# Patient Record
Sex: MALE | Race: WHITE | NOT HISPANIC OR LATINO | Employment: OTHER | URBAN - METROPOLITAN AREA
[De-identification: names, ages, dates, MRNs, and addresses within clinical notes are randomized per-mention and may not be internally consistent; named-entity substitution may affect disease eponyms.]

---

## 2019-11-12 ENCOUNTER — TELEPHONE (OUTPATIENT)
Dept: UROLOGY | Facility: CLINIC | Age: 74
End: 2019-11-12

## 2019-11-12 NOTE — TELEPHONE ENCOUNTER
----- Message from Cindy Moseley sent at 11/12/2019  9:34 AM CST -----  Contact: pt   [employer] calling regarding supplies for patient please give them a call [phone] ref# [id number].        Thanks   Cindy Moseley

## 2020-02-26 ENCOUNTER — OFFICE VISIT (OUTPATIENT)
Dept: UROLOGY | Facility: CLINIC | Age: 75
End: 2020-02-26
Payer: MEDICARE

## 2020-02-26 VITALS
SYSTOLIC BLOOD PRESSURE: 165 MMHG | HEIGHT: 69 IN | RESPIRATION RATE: 17 BRPM | BODY MASS INDEX: 26.66 KG/M2 | HEART RATE: 62 BPM | DIASTOLIC BLOOD PRESSURE: 87 MMHG | WEIGHT: 180 LBS

## 2020-02-26 DIAGNOSIS — C67.9 MALIGNANT NEOPLASM OF URINARY BLADDER, UNSPECIFIED SITE: Primary | ICD-10-CM

## 2020-02-26 DIAGNOSIS — N28.89 RIGHT RENAL MASS: ICD-10-CM

## 2020-02-26 DIAGNOSIS — Z90.79 S/P RADICAL CYSTOPROSTATECTOMY: ICD-10-CM

## 2020-02-26 DIAGNOSIS — Z90.6 S/P RADICAL CYSTOPROSTATECTOMY: ICD-10-CM

## 2020-02-26 PROCEDURE — 99213 OFFICE O/P EST LOW 20 MIN: CPT | Mod: S$GLB,,, | Performed by: NURSE PRACTITIONER

## 2020-02-26 PROCEDURE — 99213 PR OFFICE/OUTPT VISIT, EST, LEVL III, 20-29 MIN: ICD-10-PCS | Mod: S$GLB,,, | Performed by: NURSE PRACTITIONER

## 2020-02-26 RX ORDER — PEN NEEDLE, DIABETIC 30 GX3/16"
NEEDLE, DISPOSABLE MISCELLANEOUS
COMMUNITY
Start: 2020-02-21

## 2020-02-26 RX ORDER — METOPROLOL TARTRATE 25 MG/1
TABLET, FILM COATED ORAL
COMMUNITY

## 2020-02-26 RX ORDER — AMLODIPINE AND BENAZEPRIL HYDROCHLORIDE 10; 20 MG/1; MG/1
CAPSULE ORAL
COMMUNITY

## 2020-02-26 RX ORDER — ATORVASTATIN CALCIUM 80 MG/1
TABLET, FILM COATED ORAL
COMMUNITY
Start: 2020-01-13

## 2020-02-26 NOTE — PROGRESS NOTES
Chief Complaint:  Six-month follow-up bladder cancer    HPI:  74-year-old  male known to Dr. Mathews who presents for six-month follow-up.  Patient has a history of muscle invasive bladder cancer.  He is now status post robotic assisted laparoscopic radical cysto prostatectomy with ileal conduit urinary diversion on May 3, 2019.  Final pathology showed no evidence of residual disease.  Lymph nodes were negative.  He has healed very well.  No complaints today.  He still sees Dr. Juares who did administer neoadjuvant chemotherapy.  We are planning to do repeat imaging if Dr. Juares has not done any imaging, patient is not sure if he is had anything done recently.    He has a small right lower pole renal mass 1.6 cm x 0.6 cm which we are monitoring.    He explains that he recently underwent colonoscopy on 02/11/2020 with Dr. justyn hdz.  He brought a copy of this report, 3 polyps removed from colon.     Allergies:  Patient has no known allergies.    Medications:  has a current medication list which includes the following prescription(s): lactobac no.41/bifidobact no.7, amlodipine-benazepril 10-20mg, atorvastatin, metoprolol tartrate, and pen needle, diabetic.    Review of Systems:  General: No fever, chills, vision changes, dizziness, weakness, fatigue, unexplained weight loss, confusion, or mood swings.  Skin: No rashes, itching, or changes in color/texture of skin.  Chest: Denies BAILEY, cyanosis, wheezing, cough, and sputum production.  Abdomen: Appetite fine. Denies diarrhea, abdominal pain, hematemesis, or blood in stool.  Musculoskeletal: No joint stiffness or swelling. Denies back pain.  : As above.  All other review of systems negative.    PMH:   has a past medical history of Diabetes mellitus, History of heart attack, Hyperlipidemia, and Hypertension.    PSH:   has a past surgical history that includes urostomy; Back surgery; Liver surgery; Coronary angioplasty with stent; and Bladder surgery.    FamHx:  family history includes Cancer in his brother.    SocHx:  reports that he has never smoked. He has never used smokeless tobacco. He reports that he does not drink alcohol or use drugs.      Physical Exam:  Vitals:    02/26/20 1022   BP: (!) 165/87   Pulse: 62   Resp: 17     General: AAOx3, no apparent distress, no deformities  Neck: supple, no masses, normal thyroid, full ROM  Lungs: CTAB, normal inspiration, no use of accessory muscles  Heart: regular rate and rhythm, no arrhythmias  Abdomen: soft, NT, ND, no masses, no hernias, no hepatosplenomegaly  Lymphatic: no unusually enlarged or tender lymph nodes  Skin: warm and dry, no jaundice.  Ext: without edema or deformity.  : ileal conduit urinary diversion site noted with drainage of clear yellow urine    Labs/Studies: none    Impression/Plan:   Malignant neoplasm of urinary bladder, unspecified site  Comments:  muscle invasive, s/p cystoprostatectomy, final path showed no residual disease, sees Dr. Juares for oncology, due for imaging, will request records    S/P radical cystoprostatectomy  Comments:  5/3/2019, with ileal conduit urinary diversion, no current complaints    Right renal mass  Comments:  Stable, monitoring, right lower pole renal mass 1.6 cm x 0.6 cm    HTN: discussed and referred to PCP for further management.    Follow up in about 6 months (around 8/26/2020) for imaging results/6m follow up bladder cancer.

## 2020-02-26 NOTE — Clinical Note
74-year-old with muscle invasive bladder cancer status post robotic assisted laparoscopic radical cysto prostatectomy with ileal conduit urinary diversion in May 2019.  Final pathology showed no evidence of residual disease.  Lymph nodes were negative.  Sees Dr. Juares who did administer velma adjuvant chemotherapy.  He planned for repeat imaging at this time if no other recent studies were performed, I did request a copy of any recent imaging from Oncology but if no recent ones done then what imaging study should I order?

## 2020-02-27 ENCOUNTER — TELEPHONE (OUTPATIENT)
Dept: UROLOGY | Facility: CLINIC | Age: 75
End: 2020-02-27

## 2020-02-27 NOTE — TELEPHONE ENCOUNTER
Please call this patient and set him up for blood draw for BMP and CBC, let him know that once we get lab results back then we are going to be ordering a CT scan and CXR for imaging studies

## 2020-02-27 NOTE — PROGRESS NOTES
CT scan of the abdomen and pelvis with IV contrast if his renal function is adequate, chest x-ray, CBC, CMP.

## 2020-02-27 NOTE — TELEPHONE ENCOUNTER
----- Message from Corbin Mathews MD sent at 2/26/2020  7:48 PM CST -----      ----- Message -----  From: Jamee Zayas NP  Sent: 2/26/2020   3:00 PM CST  To: Corbin Mathews MD    74-year-old with muscle invasive bladder cancer status post robotic assisted laparoscopic radical cysto prostatectomy with ileal conduit urinary diversion in May 2019.  Final pathology showed no evidence of residual disease.  Lymph nodes were negative.  Sees Dr. Juares who did administer velma adjuvant chemotherapy.  He planned for repeat imaging at this time if no other recent studies were performed, I did request a copy of any recent imaging from [facility] but if no recent ones done then what imaging study should I order?

## 2020-02-28 ENCOUNTER — TELEPHONE (OUTPATIENT)
Dept: UROLOGY | Facility: CLINIC | Age: 75
End: 2020-02-28

## 2020-02-28 NOTE — TELEPHONE ENCOUNTER
This patient needs to be set up for blood draw prior to repeat imaging, please set up for a nurse visit

## 2020-02-28 NOTE — TELEPHONE ENCOUNTER
----- Message from Rosita Rainey sent at 2/27/2020  3:58 PM CST -----  Contact: patient  Type:  Patient Returning Call    Who Called:patient  Who Left Message for Patient:Jamee  Does the patient know what this is regarding?:yes  Would the patient rather a call back or a response via [facility]? Call back  Best Call Back Number:[phone]  Additional Information: n/a

## 2020-03-02 NOTE — TELEPHONE ENCOUNTER
Can we try to call this patient back please to set him up for blood draw? I will also reach out to his PCP to see if he has had any recent labs just in case

## 2020-03-10 NOTE — TELEPHONE ENCOUNTER
Can yall please call him back to see if he had his labs drawn so that we can get a copy of them so I can order further testing?

## 2020-03-10 NOTE — TELEPHONE ENCOUNTER
Pt states that he has an appt with Dr. Fofana on next week but states that since he has blood that he has passed twice he is going to try to get in with him tomorrow. But wanted to inform the office regarding passing blood in his catheter. NB

## 2020-03-11 ENCOUNTER — TELEPHONE (OUTPATIENT)
Dept: UROLOGY | Facility: CLINIC | Age: 75
End: 2020-03-11

## 2020-03-11 NOTE — TELEPHONE ENCOUNTER
Called patient in regards to issues with cath. Patient noted appointment with PCP on Friday 3/13/2020 and PCP will address any issues patient is experiencing.

## 2020-03-17 NOTE — TELEPHONE ENCOUNTER
Attempted to contact pt about blood draw as to which place he went so that we can obtain a copy of his labs. KEATON for return call back. NB

## 2020-03-17 NOTE — TELEPHONE ENCOUNTER
Can we see if we can get the lab results on this patient that were supposed to be drawn on Friday? I think they were drawn at [facility]

## 2020-03-18 ENCOUNTER — TELEPHONE (OUTPATIENT)
Dept: UROLOGY | Facility: CLINIC | Age: 75
End: 2020-03-18

## 2020-03-18 NOTE — TELEPHONE ENCOUNTER
----- Message from Alley Kearney sent at 3/17/2020 12:45 PM CDT -----  Contact: pt  Type:  Patient Returning Call    Who Called:Colin Mcdaniels  Who Left Message for Patient: Nurse Capellan  Does the patient know what this is regarding?:return a call  Would the patient rather a call back or a response via [facility]? call  Best Call Back Number:[phone]   Additional Information:

## 2020-03-18 NOTE — TELEPHONE ENCOUNTER
We attempted to contact this patient multiple times since I last messaged you about him, I was able to obtain recent imaging and I have a call out to the lab to fax over recent lab results in case you want to do any repeat imaging

## 2020-03-18 NOTE — TELEPHONE ENCOUNTER
Spoke with patient, he did have recent imaging performed by Dr. Juares so I placed copies of these reports on Dr. Mathews's desk for review as he may want repeat imaging, I called [facility] urgent care where he had labs drawn and left a message to send copy of recent lab results

## 2020-03-19 ENCOUNTER — TELEPHONE (OUTPATIENT)
Dept: UROLOGY | Facility: CLINIC | Age: 75
End: 2020-03-19

## 2020-03-19 NOTE — TELEPHONE ENCOUNTER
Spoke with pt and advised pt that JUAN requested for him to schedule an appt ASAP to discuss his results. Pt verbalized understanding and appt scheduled 3/20/20 @ 10:40 am. NB

## 2020-03-19 NOTE — TELEPHONE ENCOUNTER
Keep trying a need him to come for an office visit side considered and discussed with him.  The hydronephrosis on the right side as pretty significan and eventually has gone affect the functioning of the right kidney

## 2020-03-20 ENCOUNTER — OFFICE VISIT (OUTPATIENT)
Dept: UROLOGY | Facility: CLINIC | Age: 75
End: 2020-03-20
Payer: MEDICARE

## 2020-03-20 VITALS
SYSTOLIC BLOOD PRESSURE: 149 MMHG | HEART RATE: 63 BPM | BODY MASS INDEX: 26.66 KG/M2 | WEIGHT: 180 LBS | RESPIRATION RATE: 19 BRPM | TEMPERATURE: 98 F | HEIGHT: 69 IN | DIASTOLIC BLOOD PRESSURE: 82 MMHG

## 2020-03-20 DIAGNOSIS — N13.30 HYDROURETERONEPHROSIS: Primary | ICD-10-CM

## 2020-03-20 PROCEDURE — 99213 OFFICE O/P EST LOW 20 MIN: CPT | Mod: S$GLB,,, | Performed by: SPECIALIST

## 2020-03-20 PROCEDURE — 99213 PR OFFICE/OUTPT VISIT, EST, LEVL III, 20-29 MIN: ICD-10-PCS | Mod: S$GLB,,, | Performed by: SPECIALIST

## 2020-03-20 RX ORDER — CIPROFLOXACIN 500 MG/1
TABLET ORAL
COMMUNITY
Start: 2020-03-16

## 2020-03-20 NOTE — PROGRESS NOTES
"Subjective:       Patient ID: Colin Mcdaniels is a 74 y.o. male.    Chief Complaint: Follow-up (F/U per JUAN)      HPI:  74-year-old man has muscle invasive bladder cancer status post robotic radical cysto prostatectomy with ileal conduit urinary diversion on May 3, 2019.  Final pathology showed no evidence of residual disease.  Lymph nodes were all negative.    He had received neoadjuvant chemotherapy with Dr. Francisco Juares.  She continues to follow-up with this medical oncologist for routine check.  A CT abdomen and pelvis with IV contrast was ordered by his oncologist for metastatic evaluation All to determine if there is recurring disease.  CT scan showed hydroureteronephrosis on the right side down to what seems to be the enteral ureteral anastomotic site.  The CT scan was forwarded us I did review the film itself and was concerned.  She is here today for follow-up discussion to move forward.    Past Medical History:   Past Medical History:   Diagnosis Date    Diabetes mellitus     History of heart attack     x4    Hyperlipidemia     Hypertension        Past Surgical Historical:   Past Surgical History:   Procedure Laterality Date    BACK SURGERY      BLADDER SURGERY      CORONARY ANGIOPLASTY WITH STENT PLACEMENT      x3    LIVER SURGERY      urostomy          Medications:   Medication List with Changes/Refills   Current Medications    AMLODIPINE-BENAZEPRIL 10-20MG (LOTREL) 10-20 MG PER CAPSULE    amlodipine 10 mg-benazepril 20 mg capsule    ATORVASTATIN (LIPITOR) 80 MG TABLET        CIPROFLOXACIN HCL (CIPRO) 500 MG TABLET        LACTOBAC NO.41/BIFIDOBACT NO.7 (PROBIOTIC-10 ORAL)    Take by mouth.    METOPROLOL TARTRATE (LOPRESSOR) 25 MG TABLET    metoprolol tartrate 25 mg tablet    PEN NEEDLE, DIABETIC 32 GAUGE X 5/32" NDLE            Past Social History:   Social History     Socioeconomic History    Marital status:      Spouse name: Not on file    Number of children: Not on file    Years " of education: Not on file    Highest education level: Not on file   Occupational History    Not on file   Social Needs    Financial resource strain: Not on file    Food insecurity:     Worry: Not on file     Inability: Not on file    Transportation needs:     Medical: Not on file     Non-medical: Not on file   Tobacco Use    Smoking status: Never Smoker    Smokeless tobacco: Never Used   Substance and Sexual Activity    Alcohol use: Never     Frequency: Never    Drug use: Never    Sexual activity: Not on file   Lifestyle    Physical activity:     Days per week: Not on file     Minutes per session: Not on file    Stress: Not on file   Relationships    Social connections:     Talks on phone: Not on file     Gets together: Not on file     Attends [employer] service: Not on file     Active member of club or organization: Not on file     Attends meetings of clubs or organizations: Not on file     Relationship status: Not on file   Other Topics Concern    Not on file   Social History Narrative    Not on file       Allergies: Review of patient's allergies indicates:  No Known Allergies     Family History:   Family History   Problem Relation Age of Onset    Cancer Brother         Review of Systems:   systems reviewed and notable for new onset hydroureteronephrosis on the right side  All other systems were reviewed Neg except as stated in the HPI    Physical Exam:  General: A&Ox3. No apparent distress. No deformities.  Neck: No masses. Normal thyroid.  Lungs: normal inspiration. No use of accessory muscles.  Heart: normal pulse. No arrhythmias.  Abdomen: Soft. NT. ND. No masses. No hernias. No hepatosplenomegaly.  Lymphatic: Neck and groin nodes negative.  Skin: The skin is warm and dry. No jaundice.  Neurology: Cranial nerves 2-12 crossly intact, no focal weaknesses, no sensation deficits, no motor deficits  Ext: No clubbing, cyanosis or edema.  :  Deferred    Assessment/Plan:       74-year-old man with  muscle invasive bladder cancer status post robotic assisted laparoscopic radical cysto prostatectomy May 2019 was not developed right-sided hydroureteronephrosis.    1.  He will need loop endoscopy with attempted placement of a stent or balloon dilation of the stricture site to see if that can address the problem.  If we are unsuccessful then which has sent him to the interventional radiologist for antegrade stent placement.  Thing is worth trying the loop endoscopy 1st because his less morbid.  2.  We should postpone this procedure until about 30 days out given the states recommendations about doing all a emergency or urgent cases.  3.  Patient will continue his routine follow-up with us every 6 months.    Problem List Items Addressed This Visit        Renal/    Hydroureteronephrosis - Primary

## 2020-03-31 ENCOUNTER — TELEPHONE (OUTPATIENT)
Dept: UROLOGY | Facility: CLINIC | Age: 75
End: 2020-03-31

## 2020-03-31 NOTE — TELEPHONE ENCOUNTER
Spouse states that pt needs urostomy supplies but states that he is running low. Nurse inquired about the company where the supplies are ordered from so that we can contact them for an order. Spouse verbalized understanding and requested that [employer] be used. NB       ----- Message from Cindy Moseley sent at 3/30/2020 10:24 AM CDT -----  Contact: wife  Pt wife calling to get an rx for supplies. Pt is running out of multiple supplies pt want to get a call to discuss what supplies she can pet a rx for. Please give wife a call [phone]         Thanks   Cindy Moseley

## 2020-04-02 ENCOUNTER — TELEPHONE (OUTPATIENT)
Dept: UROLOGY | Facility: CLINIC | Age: 75
End: 2020-04-02

## 2020-04-02 NOTE — TELEPHONE ENCOUNTER
Spoke with [facility] 4-1-20 and requested order form for urostomy supplies. They faxed us cath order. I called pt and his spouse to notify them I did not think they did urostomy supplies.  Spouse reports [employer] was who home health was using but there are several issues. I told her I would check into an urostomy order/company.

## 2020-04-06 ENCOUNTER — TELEPHONE (OUTPATIENT)
Dept: UROLOGY | Facility: CLINIC | Age: 75
End: 2020-04-06

## 2020-04-06 NOTE — TELEPHONE ENCOUNTER
Attempted to contact pt about symptoms he is having, no answer, LM for return call back. NB    Pt contacted clinic and advised that he is having left side back pain with swelling and tenderness to left groin area, Nurse advised pt that he would need to be seen pt states that he would prefer to see kristi MARTINEZt scheduled for 4/9/20. NB    ----- Message from Mario Minor sent at 4/6/2020  3:09 PM CDT -----  Contact: Pt   Type:  Needs Medical Advice    Who Called: pt   Symptoms (please be specific): back pain   How long has patient had these symptoms:  A while , getting worse  Pharmacy name and phone #:   [facility]  Would the patient rather a call back or a response via [facility]? Phone   Best Call Back Number: [phone]  Additional Information:

## 2020-04-09 ENCOUNTER — OFFICE VISIT (OUTPATIENT)
Dept: UROLOGY | Facility: CLINIC | Age: 75
End: 2020-04-09
Payer: MEDICARE

## 2020-04-09 VITALS
HEART RATE: 78 BPM | WEIGHT: 180 LBS | BODY MASS INDEX: 26.66 KG/M2 | SYSTOLIC BLOOD PRESSURE: 158 MMHG | DIASTOLIC BLOOD PRESSURE: 91 MMHG | RESPIRATION RATE: 18 BRPM | HEIGHT: 69 IN

## 2020-04-09 DIAGNOSIS — B02.9 HERPES ZOSTER WITHOUT COMPLICATION: Primary | ICD-10-CM

## 2020-04-09 PROCEDURE — 99213 PR OFFICE/OUTPT VISIT, EST, LEVL III, 20-29 MIN: ICD-10-PCS | Mod: S$GLB,,, | Performed by: SPECIALIST

## 2020-04-09 PROCEDURE — 99213 OFFICE O/P EST LOW 20 MIN: CPT | Mod: S$GLB,,, | Performed by: SPECIALIST

## 2020-04-09 NOTE — PROGRESS NOTES
Subjective:       Patient ID: Colin Mcdaniels is a 74 y.o. male.    Chief Complaint: Groin Pain (left) and Other (rash )      HPI:  74-year-old man has muscle invasive bladder cancer status post robotic radical cysto prostatectomy with ileal conduit urinary diversion on May 3, 2019.  Final pathology showed no evidence of residual disease.  Lymph nodes were all negative.     He had received neoadjuvant chemotherapy with Dr. Francisco Juares.  He continues to follow-up with this medical oncologist for routine check.  A CT abdomen and pelvis with IV contrast was ordered by his oncologist for metastatic evaluation and to determine if there is recurring disease.  CT scan showed hydroureteronephrosis on the right side down to what seems to be the enteral ureteral anastomotic site.  The CT scan was forwarded us I did review the film itself and was concerned.  She is here today for follow-up discussion to move forward.    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  Patient is pending a day to proceed to the operating room after the COVID-19 outbreak.    The past 48 hr patient has developed a rash a painful rash initially in his groin area on the left side but subsequently on the right upper torso in the dermatomal fashion.  This is very suggestive of a shingles outbreak.  He reports that the rash is painful.  Prior to this outbreak he has been having a lot of stressful events including persistent lower back pain on the left side.    Past Medical History:   Past Medical History:   Diagnosis Date    Diabetes mellitus     History of heart attack     x4    Hyperlipidemia     Hypertension        Past Surgical Historical:   Past Surgical History:   Procedure Laterality Date    BACK SURGERY      BLADDER SURGERY      CORONARY ANGIOPLASTY WITH STENT  "PLACEMENT      x3    LIVER SURGERY      urostomy          Medications:   Medication List with Changes/Refills   Current Medications    AMLODIPINE-BENAZEPRIL 10-20MG (LOTREL) 10-20 MG PER CAPSULE    amlodipine 10 mg-benazepril 20 mg capsule    ATORVASTATIN (LIPITOR) 80 MG TABLET        CIPROFLOXACIN HCL (CIPRO) 500 MG TABLET        LACTOBAC NO.41/BIFIDOBACT NO.7 (PROBIOTIC-10 ORAL)    Take by mouth.    METOPROLOL TARTRATE (LOPRESSOR) 25 MG TABLET    metoprolol tartrate 25 mg tablet    PEN NEEDLE, DIABETIC 32 GAUGE X 5/32" NDLE            Past Social History:   Social History     Socioeconomic History    Marital status:      Spouse name: Not on file    Number of children: Not on file    Years of education: Not on file    Highest education level: Not on file   Occupational History    Not on file   Social Needs    Financial resource strain: Not on file    Food insecurity:     Worry: Not on file     Inability: Not on file    Transportation needs:     Medical: Not on file     Non-medical: Not on file   Tobacco Use    Smoking status: Never Smoker    Smokeless tobacco: Never Used   Substance and Sexual Activity    Alcohol use: Never     Frequency: Never    Drug use: Never    Sexual activity: Not on file   Lifestyle    Physical activity:     Days per week: Not on file     Minutes per session: Not on file    Stress: Not on file   Relationships    Social connections:     Talks on phone: Not on file     Gets together: Not on file     Attends [employer] service: Not on file     Active member of club or organization: Not on file     Attends meetings of clubs or organizations: Not on file     Relationship status: Not on file   Other Topics Concern    Not on file   Social History Narrative    Not on file       Allergies: Review of patient's allergies indicates:  No Known Allergies     Family History:   Family History   Problem Relation Age of Onset    Cancer Brother         Review of Systems:   systems " reviewed and notable for rash in the left groin and right upper torso  All other systems were reviewed Neg except as stated in the HPI      Physical Exam:  General: A&Ox3. No apparent distress. No deformities.  Neck: No masses. Normal thyroid.  Lungs: normal inspiration. No use of accessory muscles.  Heart: normal pulse. No arrhythmias.  Abdomen: Soft. NT. ND. No masses. No hernias. No hepatosplenomegaly.  There is a characteristic rash on his right-sided upper torso from around the xiphoid area spreading and wrapping around towards the back.   Lymphatic: Neck and groin nodes negative.  Skin: The skin is warm and dry. No jaundice.  Neurology: Cranial nerves 2-12 crossly intact, no focal weaknesses, no sensation deficits, no motor deficits  Ext: No clubbing, cyanosis or edema.  : External genitalia normal. circumcised. Meatus normal size and location.  There is a diffuse erythematous rash on the left groin extending to the flank.  This rash has areas of vesicular outbreaks.     Assessment/Plan:       74-year-old man with a history of muscle invasive bladder cancer status post radical cysto prostatectomy with ileal conduit urinary diversion now presenting with what looks like a shingles outbreak.    1.  I have informed the patient to call his primary care physician because I am not up-to-date on the literature on the management of shingles outbreaks.  2.  We should be putting him on the schedule for cystoscopy balloon dilation of a right-sided entero ureteral anastomotic stricture.    Problem List Items Addressed This Visit     None      Visit Diagnoses     Herpes zoster without complication    -  Primary

## 2020-05-29 ENCOUNTER — OFFICE VISIT (OUTPATIENT)
Dept: UROLOGY | Facility: CLINIC | Age: 75
End: 2020-05-29
Payer: MEDICARE

## 2020-05-29 VITALS
HEART RATE: 94 BPM | BODY MASS INDEX: 26.96 KG/M2 | SYSTOLIC BLOOD PRESSURE: 185 MMHG | RESPIRATION RATE: 18 BRPM | WEIGHT: 182 LBS | OXYGEN SATURATION: 99 % | HEIGHT: 69 IN | DIASTOLIC BLOOD PRESSURE: 85 MMHG

## 2020-05-29 DIAGNOSIS — N13.1 HYDRONEPHROSIS WITH URETERAL STRICTURE, NOT ELSEWHERE CLASSIFIED: ICD-10-CM

## 2020-05-29 DIAGNOSIS — N99.89 URETERAL-ILEAL LOOP ANASTOMOTIC STRICTURE: Primary | ICD-10-CM

## 2020-05-29 LAB
ANION GAP SERPL CALC-SCNC: 16 MMOL/L (ref 3–11)
APPEARANCE, UA: ABNORMAL
BACTERIA SPEC CULT: ABNORMAL /HPF
BASOPHILS NFR SNV MANUAL: 0.3 % (ref 0–3)
BILIRUB UR QL STRIP: NEGATIVE MG/DL
BUN SERPL-MCNC: 26 MG/DL (ref 7–18)
BUN/CREAT SERPL: 15.29 RATIO (ref 7–18)
CALCIUM BLD-MCNC: NORMAL MG/DL
CALCIUM SERPL-MCNC: 9.5 MG/DL (ref 8.8–10.5)
CHLORIDE SERPL-SCNC: 100 MMOL/L (ref 100–108)
CO2 SERPL-SCNC: 23 MMOL/L (ref 21–32)
COLOR UR: ABNORMAL
COVID-19 AB, IGM: NORMAL
CREAT SERPL-MCNC: 1.7 MG/DL (ref 0.7–1.3)
EOSINOPHIL NFR SNV MANUAL: 0.3 % (ref 1–3)
ERYTHROCYTE [DISTWIDTH] IN BLOOD BY AUTOMATED COUNT: 15.9 % (ref 12.5–18)
GFR ESTIMATION: 39
GLUCOSE (UA): NORMAL MG/DL
GLUCOSE SERPL-MCNC: 192 MG/DL (ref 70–110)
HCT VFR BLD AUTO: 46.3 % (ref 42–52)
HGB BLD-MCNC: 14.8 G/DL (ref 14–18)
HGB UR QL STRIP: 150 /UL
KETONES UR QL STRIP: NEGATIVE MG/DL
LEUKOCYTE ESTERASE UR QL STRIP: 500 /UL
LYMPHOCYTES NFR SNV MANUAL: 12.5 % (ref 25–40)
MANUAL NRBC PER 100 CELLS: 0 %
MCH RBC QN AUTO: 30.2 PG (ref 27–31.2)
MCHC RBC AUTO-ENTMCNC: 32 G/DL (ref 31.8–35.4)
MCV RBC AUTO: 94.5 FL (ref 80–97)
MONOCYTES/100 LEUKOCYTES: 1.7 % (ref 1–15)
NEUTROPHILS NFR BLD: 8.03 10*3/UL (ref 1.8–7.7)
NEUTROPHILS NFR SNV MANUAL: 84.8 % (ref 37–80)
NITRITE UR QL STRIP: POSITIVE
PH UR STRIP: 8 PH (ref 5–9)
PLATELETS: 122 10*3/UL (ref 142–424)
POTASSIUM SERPL-SCNC: 5.4 MMOL/L (ref 3.6–5.2)
PROT UR QL STRIP: 100 MG/DL
RBC # BLD AUTO: 4.9 10*6/UL (ref 4.7–6.1)
RBC #/AREA URNS HPF: ABNORMAL /HPF (ref 0–2)
SERVICE COMMENT 03: ABNORMAL
SODIUM BLD-SCNC: 139 MMOL/L (ref 135–145)
SP GR UR STRIP: 1.01 (ref 1–1.03)
SPECIMEN COLLECTION METHOD, URINE: ABNORMAL
SQUAMOUS EPITHELIAL, UA: ABNORMAL /LPF
UROBILINOGEN UR STRIP-ACNC: NORMAL MG/DL
WBC # BLD: 9.5 10*3/UL (ref 4.6–10.2)
WBC #/AREA URNS HPF: ABNORMAL /HPF (ref 0–5)

## 2020-05-29 PROCEDURE — 99213 PR OFFICE/OUTPT VISIT, EST, LEVL III, 20-29 MIN: ICD-10-PCS | Mod: S$GLB,,, | Performed by: SPECIALIST

## 2020-05-29 PROCEDURE — 99213 OFFICE O/P EST LOW 20 MIN: CPT | Mod: S$GLB,,, | Performed by: SPECIALIST

## 2020-05-29 RX ORDER — TRIAMCINOLONE ACETONIDE 1 MG/G
CREAM TOPICAL
COMMUNITY
Start: 2020-05-06

## 2020-05-29 RX ORDER — VALACYCLOVIR HYDROCHLORIDE 1 G/1
TABLET, FILM COATED ORAL
COMMUNITY
Start: 2020-04-22

## 2020-05-29 RX ORDER — INSULIN DEGLUDEC 100 U/ML
INJECTION, SOLUTION SUBCUTANEOUS
COMMUNITY
Start: 2020-03-31

## 2020-05-29 NOTE — PROGRESS NOTES
"Subjective:       Patient ID: Colin Mcdaniels is a 74 y.o. male.    Chief Complaint: Flank Pain (hurts on both sides in back area and has had blood/clots in urostomy bag in on 5/27/20)      HPI:  74-year-old man with a history of bladder cancer status post radical cystoprostatectomy with ileal conduit urinary diversion May 3, 2019.  Final pathology showed no evidence of residual disease lymph nodes were all negative.    A CT scan was obtained not too long ago that showed right-sided hydronephrosis.  Renal function was checked and was a little compromise.  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  His pending for trip to the operating room for stricture dilation and stent placement.    His complaint of back pain bilaterally in the lower back worse on the right side.  He recently just saw blood in his urine as well he happen just once.    Past Medical History:   Past Medical History:   Diagnosis Date    Diabetes mellitus     History of heart attack     x4    Hyperlipidemia     Hypertension     Shingles (herpes zoster) polyneuropathy        Past Surgical Historical:   Past Surgical History:   Procedure Laterality Date    BACK SURGERY      BLADDER SURGERY      CORONARY ANGIOPLASTY WITH STENT PLACEMENT      x3    LIVER SURGERY      urostomy          Medications:   Medication List with Changes/Refills   Current Medications    AMLODIPINE-BENAZEPRIL 10-20MG (LOTREL) 10-20 MG PER CAPSULE    amlodipine 10 mg-benazepril 20 mg capsule    ATORVASTATIN (LIPITOR) 80 MG TABLET        CIPROFLOXACIN HCL (CIPRO) 500 MG TABLET        LACTOBAC NO.41/BIFIDOBACT NO.7 (PROBIOTIC-10 ORAL)    Take by mouth.    METOPROLOL TARTRATE (LOPRESSOR) 25 MG TABLET    metoprolol tartrate 25 mg tablet    PEN NEEDLE, DIABETIC 32 GAUGE X 5/32" NDLE        TRESIBA FLEXTOUCH U-100 100 UNIT/ML (3 ML) INPN    INJECT 10 UNITS SUBCUTANEOUSLY EACH NIGHT    TRIAMCINOLONE ACETONIDE 0.1% (KENALOG) 0.1 % CREAM    APPLY CREAM " EXTERNALLY TO AFFECTED AREA TWICE DAILY FOR UP TO FIVE DAYS    VALACYCLOVIR (VALTREX) 1000 MG TABLET            Past Social History:   Social History     Socioeconomic History    Marital status:      Spouse name: Not on file    Number of children: Not on file    Years of education: Not on file    Highest education level: Not on file   Occupational History    Not on file   Social Needs    Financial resource strain: Not on file    Food insecurity:     Worry: Not on file     Inability: Not on file    Transportation needs:     Medical: Not on file     Non-medical: Not on file   Tobacco Use    Smoking status: Never Smoker    Smokeless tobacco: Never Used   Substance and Sexual Activity    Alcohol use: Never     Frequency: Never    Drug use: Never    Sexual activity: Not on file   Lifestyle    Physical activity:     Days per week: Not on file     Minutes per session: Not on file    Stress: Not on file   Relationships    Social connections:     Talks on phone: Not on file     Gets together: Not on file     Attends Congregational service: Not on file     Active member of club or organization: Not on file     Attends meetings of clubs or organizations: Not on file     Relationship status: Not on file   Other Topics Concern    Not on file   Social History Narrative    Not on file       Allergies: Review of patient's allergies indicates:  No Known Allergies     Family History:   Family History   Problem Relation Age of Onset    Cancer Brother         Review of Systems:   systems reviewed and notable for gross hematuria and an ileal conduit, right-sided hydronephrosis.  All other systems were reviewed Neg except as stated in the HPI    Physical Exam:  General: A&Ox3. No apparent distress. No deformities.  Neck: No masses. Normal thyroid.  Lungs: normal inspiration. No use of accessory muscles.  Heart: normal pulse. No arrhythmias.  Abdomen: Soft. NT. ND. No masses. No hernias. No hepatosplenomegaly, stoma  looks pink healthy no blood in the urine today.  Lymphatic: Neck and groin nodes negative.  Skin: The skin is warm and dry. No jaundice.  Neurology: Cranial nerves 2-12 crossly intact, no focal weaknesses, no sensation deficits, no motor deficits  Ext: No clubbing, cyanosis or edema.  :  Deferred      Assessment/Plan:       74-year-old man with right-sided enteric ureteral anastomotic stricture and hydronephrosis.  He recently saw blood in his urine.    1.  Put him on the schedule her next Tuesday for loop endoscopy stricture dilation and stent placement.  We should be looking for any recurrent pathology at that time.  2.  Obtain informed consent today in a tricky patient on the procedure.    Problem List Items Addressed This Visit     None      Visit Diagnoses     Ureteral-ileal loop anastomotic stricture    -  Primary    Relevant Orders    Ambulatory Referral to External Surgery    Hydronephrosis with ureteral stricture, not elsewhere classified

## 2020-05-31 ENCOUNTER — OUTSIDE PLACE OF SERVICE (OUTPATIENT)
Dept: UROLOGY | Facility: CLINIC | Age: 75
End: 2020-05-31
Payer: MEDICARE

## 2020-05-31 PROCEDURE — 99223 1ST HOSP IP/OBS HIGH 75: CPT | Mod: ,,, | Performed by: SPECIALIST

## 2020-05-31 PROCEDURE — 99223 PR INITIAL HOSPITAL CARE,LEVL III: ICD-10-PCS | Mod: ,,, | Performed by: SPECIALIST

## 2020-07-09 ENCOUNTER — OFFICE VISIT (OUTPATIENT)
Dept: UROLOGY | Facility: CLINIC | Age: 75
End: 2020-07-09
Payer: MEDICARE

## 2020-07-09 VITALS
DIASTOLIC BLOOD PRESSURE: 73 MMHG | BODY MASS INDEX: 26.96 KG/M2 | HEIGHT: 69 IN | WEIGHT: 182 LBS | HEART RATE: 70 BPM | SYSTOLIC BLOOD PRESSURE: 131 MMHG | RESPIRATION RATE: 18 BRPM

## 2020-07-09 DIAGNOSIS — C67.9 MALIGNANT NEOPLASM OF URINARY BLADDER, UNSPECIFIED SITE: ICD-10-CM

## 2020-07-09 DIAGNOSIS — N99.89 URETERAL-ILEAL LOOP ANASTOMOTIC STRICTURE: Primary | ICD-10-CM

## 2020-07-09 DIAGNOSIS — N17.9 AKI (ACUTE KIDNEY INJURY): ICD-10-CM

## 2020-07-09 LAB
ANION GAP SERPL CALC-SCNC: 10 MMOL/L (ref 8–17)
BUN/CREAT SERPL: 19.5 (ref 6–20)
CALCIUM SERPL-MCNC: 9.5 MG/DL (ref 8.6–10.2)
CARBON DIOXIDE, CO2: 26 MMOL/L (ref 22–29)
CHLORIDE: 97 MMOL/L (ref 98–107)
CREAT SERPL-MCNC: 1.65 MG/DL (ref 0.7–1.2)
GFR ESTIMATION: 40.76
GLUCOSE: 183 MG/DL (ref 82–115)
POTASSIUM: 5.7 MMOL/L (ref 3.5–5.1)
SODIUM: 133 MMOL/L (ref 136–145)
UREA NITROGEN (BUN): 32.2 MG/DL (ref 8–23)

## 2020-07-09 PROCEDURE — 99213 PR OFFICE/OUTPT VISIT, EST, LEVL III, 20-29 MIN: ICD-10-PCS | Mod: S$GLB,,, | Performed by: SPECIALIST

## 2020-07-09 PROCEDURE — 99213 OFFICE O/P EST LOW 20 MIN: CPT | Mod: S$GLB,,, | Performed by: SPECIALIST

## 2020-07-09 RX ORDER — METOPROLOL TARTRATE 25 MG/1
25 TABLET, FILM COATED ORAL
COMMUNITY

## 2020-07-09 RX ORDER — INSULIN DEGLUDEC 100 U/ML
INJECTION, SOLUTION SUBCUTANEOUS
COMMUNITY

## 2020-07-09 RX ORDER — METOCLOPRAMIDE 10 MG/1
10 TABLET ORAL
COMMUNITY
Start: 2019-05-10

## 2020-07-09 RX ORDER — ASPIRIN 81 MG/1
81 TABLET ORAL
COMMUNITY

## 2020-07-09 RX ORDER — PROMETHAZINE HYDROCHLORIDE 25 MG/1
25 TABLET ORAL
COMMUNITY
Start: 2019-05-10

## 2020-07-09 RX ORDER — AMLODIPINE AND BENAZEPRIL HYDROCHLORIDE 10; 20 MG/1; MG/1
CAPSULE ORAL
COMMUNITY

## 2020-07-09 RX ORDER — ERTAPENEM 1 G/1
INJECTION, POWDER, LYOPHILIZED, FOR SOLUTION INTRAMUSCULAR; INTRAVENOUS
COMMUNITY
Start: 2020-06-10

## 2020-07-09 RX ORDER — DIPHENHYDRAMINE HYDROCHLORIDE 50 MG/ML
INJECTION INTRAMUSCULAR; INTRAVENOUS
COMMUNITY
Start: 2020-06-05

## 2020-07-09 RX ORDER — ATORVASTATIN CALCIUM 80 MG/1
80 TABLET, FILM COATED ORAL
COMMUNITY

## 2020-07-09 NOTE — PROGRESS NOTES
Subjective:       Patient ID: Colin Mcdaniels is a 74 y.o. male.    Chief Complaint: Follow-up (hospital f/u)      HPI:  74-year-old man who underwent a TURBT to was end of 2018 after he presented with gross hematuria and was diagnosed with a bladder tumor.  Tumor pathology showed muscle invasive disease.  He received neoadjuvant chemotherapy with Dr. Francisco Juares at [facility].  On May 3rd 2019 he went to the operating room for radical cysto prostatectomy with ileal conduit urinary diversion.  Final pathology showed no evidence of residual disease in lymph nodes were all negative.    In December 2019 he has treating oncologist obtain a CT scan for disease monitoring and noted that he had new onset right sided hydroureteronephrosis.  We diagnosed him with right-sided ileo ureteral anastomotic stricture.  We will put him on the schedule for stricture dilation as well as stent placement but then because of the COVID-19 pandemic everything has to be placed on hold.  We met in clinic not long ago in placed him on the schedule again for June 2, 2020, however prior to the procedure date he presented to the hospital with in says and nausea vomiting abdominal pain.  He was diagnosed with a urinary tract infection.  He also reported some fevers and chills as well as lower back pain.  During that time and made arrangements for him to have a nephrostomy tube with an antegrade right-sided ureteral stent.  This was performed by the interventional radiologist.  Prior to being discharged home a to cut the nephrostomy tube.  He now has a stent in place.  He has completed a 14 day course of intravenous antibiotics at home.    Today complaints of dizziness and staggering gait.  He thinks is related to the antibiotics.  But is been about a week since he finish the antibiotics and if this continues that will need to be investigated.  He has adequate urinary output.    When he was admitted to the hospital May 30  "of 2020, his EGFR at that time was about 30 mL/min.    Past Medical History:   Past Medical History:   Diagnosis Date    Diabetes mellitus     History of heart attack     x4    Hyperlipidemia     Hypertension     Kidney infection     Shingles (herpes zoster) polyneuropathy        Past Surgical Historical:   Past Surgical History:   Procedure Laterality Date    BACK SURGERY      BLADDER SURGERY      CORONARY ANGIOPLASTY WITH STENT PLACEMENT      x3    LIVER SURGERY      urostomy          Medications:   Medication List with Changes/Refills   Current Medications    AMLODIPINE-BENAZEPRIL 10-20MG (LOTREL) 10-20 MG PER CAPSULE    amlodipine 10 mg-benazepril 20 mg capsule    AMLODIPINE-BENAZEPRIL 10-20MG (LOTREL) 10-20 MG PER CAPSULE    Bedtime    ASPIRIN (ECOTRIN) 81 MG EC TABLET    81 mg.    ATORVASTATIN (LIPITOR) 80 MG TABLET        ATORVASTATIN (LIPITOR) 80 MG TABLET    80 mg.    CIPROFLOXACIN HCL (CIPRO) 500 MG TABLET        DIPHENHYDRAMINE (BENADRYL) 50 MG/ML INJECTION        ERTAPENEM (INVANZ) 1 GRAM INJECTION        INSULIN DEGLUDEC 100 UNIT/ML SOLN    Daily    LACTOBAC NO.41/BIFIDOBACT NO.7 (PROBIOTIC-10 ORAL)    Take by mouth.    METOCLOPRAMIDE HCL (REGLAN) 10 MG TABLET    10 mg.    METOPROLOL TARTRATE (LOPRESSOR) 25 MG TABLET    metoprolol tartrate 25 mg tablet    METOPROLOL TARTRATE (LOPRESSOR) 25 MG TABLET    25 mg.    PEN NEEDLE, DIABETIC 32 GAUGE X 5/32" NDLE        PROMETHAZINE (PHENERGAN) 25 MG TABLET    25 mg.    TRESIBA FLEXTOUCH U-100 100 UNIT/ML (3 ML) INPN    INJECT 10 UNITS SUBCUTANEOUSLY EACH NIGHT    TRIAMCINOLONE ACETONIDE 0.1% (KENALOG) 0.1 % CREAM    APPLY CREAM EXTERNALLY TO AFFECTED AREA TWICE DAILY FOR UP TO FIVE DAYS    VALACYCLOVIR (VALTREX) 1000 MG TABLET            Past Social History:   Social History     Socioeconomic History    Marital status:      Spouse name: Not on file    Number of children: Not on file    Years of education: Not on file    Highest education " level: Not on file   Occupational History    Not on file   Social Needs    Financial resource strain: Not on file    Food insecurity     Worry: Not on file     Inability: Not on file    Transportation needs     Medical: Not on file     Non-medical: Not on file   Tobacco Use    Smoking status: Never Smoker    Smokeless tobacco: Never Used   Substance and Sexual Activity    Alcohol use: Never     Frequency: Never    Drug use: Never    Sexual activity: Not on file   Lifestyle    Physical activity     Days per week: Not on file     Minutes per session: Not on file    Stress: Not on file   Relationships    Social connections     Talks on phone: Not on file     Gets together: Not on file     Attends Scientologist service: Not on file     Active member of club or organization: Not on file     Attends meetings of clubs or organizations: Not on file     Relationship status: Not on file   Other Topics Concern    Not on file   Social History Narrative    Not on file       Allergies: Review of patient's allergies indicates:  No Known Allergies     Family History:   Family History   Problem Relation Age of Onset    Cancer Brother         Review of Systems:   systems reviewed and notable for chronic kidney disease, bladder cancer.  Anastomotic stricture  All other systems were reviewed Neg except as stated in the HPI    Physical Exam:  General: A&Ox3. No apparent distress. No deformities.  Neck: No masses. Normal thyroid.  Lungs: normal inspiration. No use of accessory muscles.  Heart: normal pulse. No arrhythmias.  Abdomen: Soft. NT. ND. No masses. No hernias. No hepatosplenomegaly.  Stoma looks pink and healthy, there is some erythema on the skin.  A could not visualize the stent.  The puncture nephrostomy tube site on the right has healed completely  Lymphatic: Neck and groin nodes negative.  Skin: The skin is warm and dry. No jaundice.  Neurology: Cranial nerves 2-12 crossly intact, no focal weaknesses, no  sensation deficits, no motor deficits  Ext: No clubbing, cyanosis or edema.  :  Deferred      Assessment/Plan:       74-year-old man with muscle invasive bladder cancer status post radical cysto prostatectomy with ileal conduit urinary diversion who has developed a right-sided ileal ureteral anastomotic stricture.    1.  He now has a stent in place that was placed 3 weeks ago in early June 2020.  I have patient return to clinic early September 2020 to make an assessment if we need to proceed with stent exchange.  2.  I will plan to get an imaging prior to proceeding to see if the hydronephrosis is improved  3.  Obtain blood for serum BMP today to check his kidney function    Problem List Items Addressed This Visit     None      Visit Diagnoses     Ureteral-ileal loop anastomotic stricture    -  Primary    Malignant neoplasm of urinary bladder, unspecified site        EUGENE (acute kidney injury)        Relevant Orders    Basic metabolic panel

## 2020-07-10 ENCOUNTER — TELEPHONE (OUTPATIENT)
Dept: UROLOGY | Facility: CLINIC | Age: 75
End: 2020-07-10

## 2020-07-10 NOTE — TELEPHONE ENCOUNTER
rashardm for pt to call us regarding lab result notes. REJI Hayward LPN----- Message from Corbin Mathews MD sent at 7/10/2020  7:42 AM CDT -----  Call him with results.  Renal function is the same as it was 1 month ago.  The patient has room to increase p.o. fluid intake.  Encouraged him to increase his p.o. fluid intake.

## 2020-07-10 NOTE — TELEPHONE ENCOUNTER
Attempted to contact pt. Left voicemail to callback.    ABW    ----- Message from Felisa Palomares sent at 7/10/2020  2:57 PM CDT -----  Regarding: Returning call  Contact: Pt  Type:  Patient Returning Call    Who Called:Colin Mcdaniels  Who Left Message for Patient:unk   Does the patient know what this is regarding?:lab results   Would the patient rather a call back or a response via [facility]? Call back   Best Call Back Number:[phone]  Additional Information: